# Patient Record
Sex: MALE | Race: WHITE | NOT HISPANIC OR LATINO | Employment: FULL TIME | URBAN - METROPOLITAN AREA
[De-identification: names, ages, dates, MRNs, and addresses within clinical notes are randomized per-mention and may not be internally consistent; named-entity substitution may affect disease eponyms.]

---

## 2018-02-05 ENCOUNTER — TRANSCRIBE ORDERS (OUTPATIENT)
Dept: ENDOCRINOLOGY | Facility: CLINIC | Age: 28
End: 2018-02-05

## 2018-02-05 DIAGNOSIS — R79.89 ELEVATED CORTISOL LEVEL: Primary | ICD-10-CM

## 2018-02-05 DIAGNOSIS — R61 HYPERHIDROSIS: ICD-10-CM

## 2018-02-05 DIAGNOSIS — L90.6 STRIAE: ICD-10-CM

## 2018-06-04 ENCOUNTER — OFFICE VISIT (OUTPATIENT)
Dept: ENDOCRINOLOGY | Facility: CLINIC | Age: 28
End: 2018-06-04

## 2018-06-04 VITALS
HEART RATE: 81 BPM | SYSTOLIC BLOOD PRESSURE: 120 MMHG | BODY MASS INDEX: 34.4 KG/M2 | DIASTOLIC BLOOD PRESSURE: 74 MMHG | OXYGEN SATURATION: 99 % | HEIGHT: 72 IN | WEIGHT: 254 LBS

## 2018-06-04 DIAGNOSIS — R61 EXCESSIVE SWEATING: ICD-10-CM

## 2018-06-04 DIAGNOSIS — R68.89 ABNORMAL ENDOCRINE LABORATORY TEST FINDING: Primary | ICD-10-CM

## 2018-06-04 LAB — HBA1C MFR BLD: 5 %

## 2018-06-04 PROCEDURE — 99243 OFF/OP CNSLTJ NEW/EST LOW 30: CPT | Performed by: INTERNAL MEDICINE

## 2018-06-04 PROCEDURE — 83036 HEMOGLOBIN GLYCOSYLATED A1C: CPT | Performed by: INTERNAL MEDICINE

## 2018-06-04 RX ORDER — BUSPIRONE HYDROCHLORIDE 15 MG/1
15 TABLET ORAL 2 TIMES DAILY
COMMUNITY
End: 2022-07-14

## 2018-06-04 RX ORDER — DEXTROAMPHETAMINE SACCHARATE, AMPHETAMINE ASPARTATE, DEXTROAMPHETAMINE SULFATE AND AMPHETAMINE SULFATE 5; 5; 5; 5 MG/1; MG/1; MG/1; MG/1
40 TABLET ORAL DAILY
COMMUNITY

## 2018-06-04 RX ORDER — DEXAMETHASONE 1 MG
TABLET ORAL
Qty: 1 TABLET | Refills: 0 | Status: SHIPPED | OUTPATIENT
Start: 2018-06-04 | End: 2022-07-14

## 2018-06-04 RX ORDER — FLUOXETINE HYDROCHLORIDE 20 MG/1
10 CAPSULE ORAL DAILY
COMMUNITY

## 2018-06-04 RX ORDER — DEXTROAMPHETAMINE SACCHARATE, AMPHETAMINE ASPARTATE, DEXTROAMPHETAMINE SULFATE AND AMPHETAMINE SULFATE 2.5; 2.5; 2.5; 2.5 MG/1; MG/1; MG/1; MG/1
10 TABLET ORAL AS NEEDED
COMMUNITY
End: 2022-07-14

## 2018-06-08 ENCOUNTER — TELEPHONE (OUTPATIENT)
Dept: INTERNAL MEDICINE | Facility: CLINIC | Age: 28
End: 2018-06-08

## 2018-06-08 NOTE — TELEPHONE ENCOUNTER
PATIENT WAS TOLD TO HAVE LABS DRAWN FOR OVERNIGHT DEXA AND AM CORTISOL. HE STATES HE DID NOT REALIZE HE NEEDED TO BE FASTING AND ATE 4 HOURS BEFORE THE BLOOD DRAW. HE IS CALLING TO SEE IF HE NEEDS TO HAVE THIS REDRAWN. YOU CAN REACH PATIENT [phone].

## 2018-06-08 NOTE — TELEPHONE ENCOUNTER
Spoke with  and she called patient . Informed patient that he does not have to repeat labs that it is ok that he was not fasting .

## 2018-06-09 DIAGNOSIS — R68.89 ABNORMAL ENDOCRINE LABORATORY TEST FINDING: ICD-10-CM

## 2018-06-09 LAB — CORTIS AM PEAK SERPL-MCNC: 0.6 UG/DL (ref 6.2–19.4)

## 2018-06-12 LAB
CORTIS F 24H UR-MRATE: 50 UG/24 HR (ref 0–50)
CORTIS F UR-MCNC: 16 UG/L
CREAT 24H UR-MRATE: 2437 MG/24 HR (ref 1000–2000)
CREAT UR-MCNC: 78.6 MG/DL

## 2018-06-13 ENCOUNTER — TELEPHONE (OUTPATIENT)
Dept: ENDOCRINOLOGY | Facility: CLINIC | Age: 28
End: 2018-06-13

## 2018-06-13 LAB
CORTIS SAL-MCNC: 0.01 UG/DL
CORTIS SAL-MCNC: 0.01 UG/DL

## 2018-06-19 PROBLEM — R61 EXCESSIVE SWEATING: Status: ACTIVE | Noted: 2018-06-19

## 2018-06-19 NOTE — PROGRESS NOTES
Chief Complaint   Patient presents with   • Elevated Cortisol     Consult for Dr. Loren Mas        HPI:   Andreas Barcenas is a 28 y.o.male sent in consultation by Loren Mas MD for further evaluation of an elevated 24 hr urine cortosol. 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  - he presented to his PCP in early 2018 with complaints of weight gain, difficulty concentrating/comprehending, difficulty sleeping and excessive sweating. On further review, these are not new symptoms and have been present for several years. Specifically, he has had difficulty sleeping since high school. He reports he is able to go to sleep by 10-11 pm, but wakes up between 3-5 AM and has difficulty falling back asleep. This occurs on trazadone and melatonin. He reports his ADHD is fairly well controlled on Adderall and amphetamine salts PRN. His depression and anxiety have been fairly well controlled with fluoxetine and buspirone.   - He has had excessive sweating since for > 5 years that started before any of his current medications. The sweating occurs occasionally while he sleeps.     - He was concerned that he might have Cushing's and the following evaluation was completed by his PCP:  (01/2018) 24 hr urine cortisol 51.6 (normal < 50), 24 hr urine creatinine 2.84 (high), total volume 2600 mL - pt does not recall having uncontrolled depression at that time. TSH was normal at 1.45  (02/27/18) T laboratories: [place] salivary cortisol 1.5 (0.4 - 1.0)    In further review, he has not had an increase in BP, does not had excessive bruising, does not have hyperglycemia, denies increased acne.    Review of Systems   Constitutional: Positive for fatigue.        Weight gain   HENT: Negative.    Eyes: Negative.    Respiratory: Negative.    Cardiovascular: Negative.    Gastrointestinal: Negative.    Endocrine: Positive for  "heat intolerance.   Genitourinary: Negative.    Musculoskeletal: Positive for arthralgias.   Skin: Positive for wound.        Excessive sweating   Allergic/Immunologic: Negative.    Neurological: Negative.    Hematological: Negative.    Psychiatric/Behavioral: Positive for sleep disturbance.        Anxiety, depression     Past Medical History:   Diagnosis Date   • ADHD    • Depression    • Obesity      family history includes Cancer in his paternal grandfather; Diabetes in his father and paternal grandfather; Hypertension in his father; Mental illness in his brother and mother; Obesity in his brother, father, and maternal grandfather; Stroke in his father; Thyroid disease in his father.  No past surgical history on file.  Social History   Substance Use Topics   • Smoking status: Never Smoker   • Smokeless tobacco: Never Used   • Alcohol use 0.6 oz/week     1 Standard drinks or equivalent per week      Comment: 1 to 2 per day      Current Outpatient Prescriptions:   •  amphetamine-dextroamphetamine (ADDERALL) 10 MG tablet, Take 10 mg by mouth As Needed., Disp: , Rfl:   •  amphetamine-dextroamphetamine (ADDERALL) 20 MG tablet, Take 40 mg by mouth Daily., Disp: , Rfl:   •  busPIRone (BUSPAR) 15 MG tablet, Take 15 mg by mouth 2 (Two) Times a Day., Disp: , Rfl:   •  FLUoxetine (PROzac) 20 MG capsule, Take 20 mg by mouth Daily., Disp: , Rfl:     No Known Allergies    /74   Pulse 81   Ht 182.9 cm (72\")   Wt 115 kg (254 lb)   SpO2 99%   BMI 34.45 kg/m²   Physical Exam   Constitutional: He is oriented to person, place, and time. He appears well-developed. No distress.   overweight   HENT:   Head: Normocephalic.   Mouth/Throat: Oropharynx is clear and moist.   Eyes: Conjunctivae and EOM are normal. Pupils are equal, round, and reactive to light.   Neck: Neck supple. No tracheal deviation present. No thyromegaly present.   No palpable thyroid nodules     Cardiovascular: Normal rate, regular rhythm and normal heart " sounds.    No murmur heard.  Pulmonary/Chest: Effort normal and breath sounds normal. No respiratory distress.   Abdominal: Soft. Bowel sounds are normal. He exhibits no mass. There is no tenderness.   Thin white striae   Musculoskeletal: He exhibits no edema.   Lymphadenopathy:     He has no cervical adenopathy.   Neurological: He is alert and oriented to person, place, and time. No cranial nerve deficit.   Skin: Skin is warm and dry. He is not diaphoretic. No erythema.   Psychiatric: He has a normal mood and affect. His behavior is normal.   Vitals reviewed.    LABS/IMAGING: outside records reviewed and summarized in HPI  Results for orders placed or performed in visit on 06/04/18   POC Glycosylated Hemoglobin (Hb A1C)   Result Value Ref Range    Hemoglobin A1C 5.0 %     See addendum for outside labs completed after his visit  ASSESSMENT/PLAN:  1) abnormal thyroid function test: resolved  - reviewed with patient that his increased 24 hr urine cortisol is actually not elevated when adjusted for his high 24 hour creatinine.  - In addition, discussed that not all lab assays for salivary hormones are reliable. Specifically, [facility] laboratories is not recommended by most Endocrine societies. Reviewed some of the multiple causes for false positive results.  - overall, discussed with patient I have a low clinical suspicion for hypercortisolemia based on his history and physical exam; however, completing proper screening tests is reasonable.  Will have the patient complete a 24 hr urine cortisol with creatinine, 2 midnight salivary cortisol samples at [facility], and a 1 mg overnight dexamethasone suppression test.    ADDENDUM: lab results -   (06/07/2018) 24 hour urine creatinine 2,437 (1000 - 2000 mg/24 hrs), 24 hour urine cortisol 50 mcg/24 hrs - NORMAL  (06/07/2018) midnight salivary cortisol - 0.013 (<0.010 - 0.90) - NORMAL  (06/08/2018) midnight salivary cortisol - 0.014 (<0.010 - 0.90) - NORMAL  (06/09/2018) 8AM  cortisol after 1 mg dexamethasone overnight suppression: 0.6 (normal suppression < 1.8) - NORMAL    Lab results were discussed with patient and copy of results were mailed to him    2) excessive sweating:  - no clear endocrine cause for his excessive sweating  - discussed that oxybutynin 2.5 to 5 mg BID can be used off label for excessive sweating if anticholinergic side effects can be tolerated. Pt to discuss with his PCP.    RTC as needed

## 2022-07-14 ENCOUNTER — OFFICE VISIT (OUTPATIENT)
Dept: FAMILY MEDICINE CLINIC | Facility: CLINIC | Age: 32
End: 2022-07-14

## 2022-07-14 ENCOUNTER — LAB (OUTPATIENT)
Dept: LAB | Facility: HOSPITAL | Age: 32
End: 2022-07-14

## 2022-07-14 VITALS
OXYGEN SATURATION: 99 % | SYSTOLIC BLOOD PRESSURE: 126 MMHG | DIASTOLIC BLOOD PRESSURE: 86 MMHG | BODY MASS INDEX: 42.66 KG/M2 | HEIGHT: 72 IN | WEIGHT: 315 LBS | HEART RATE: 86 BPM

## 2022-07-14 DIAGNOSIS — Z00.00 ENCOUNTER FOR MEDICAL EXAMINATION TO ESTABLISH CARE: Primary | ICD-10-CM

## 2022-07-14 DIAGNOSIS — R05.9 COUGH: ICD-10-CM

## 2022-07-14 DIAGNOSIS — Z00.00 ENCOUNTER FOR MEDICAL EXAMINATION TO ESTABLISH CARE: ICD-10-CM

## 2022-07-14 DIAGNOSIS — E66.01 CLASS 3 SEVERE OBESITY DUE TO EXCESS CALORIES WITHOUT SERIOUS COMORBIDITY WITH BODY MASS INDEX (BMI) OF 40.0 TO 44.9 IN ADULT: ICD-10-CM

## 2022-07-14 DIAGNOSIS — Z23 IMMUNIZATION DUE: ICD-10-CM

## 2022-07-14 DIAGNOSIS — F90.9 ATTENTION DEFICIT HYPERACTIVITY DISORDER (ADHD), UNSPECIFIED ADHD TYPE: ICD-10-CM

## 2022-07-14 DIAGNOSIS — F41.9 ANXIETY: ICD-10-CM

## 2022-07-14 LAB
ALBUMIN SERPL-MCNC: 4.8 G/DL (ref 3.5–5.2)
ALBUMIN/GLOB SERPL: 1.8 G/DL
ALP SERPL-CCNC: 85 U/L (ref 39–117)
ALT SERPL W P-5'-P-CCNC: 72 U/L (ref 1–41)
ANION GAP SERPL CALCULATED.3IONS-SCNC: 13.9 MMOL/L (ref 5–15)
AST SERPL-CCNC: 34 U/L (ref 1–40)
BILIRUB SERPL-MCNC: 0.4 MG/DL (ref 0–1.2)
BUN SERPL-MCNC: 14 MG/DL (ref 6–20)
BUN/CREAT SERPL: 13.7 (ref 7–25)
CALCIUM SPEC-SCNC: 9 MG/DL (ref 8.6–10.5)
CHLORIDE SERPL-SCNC: 102 MMOL/L (ref 98–107)
CHOLEST SERPL-MCNC: 191 MG/DL (ref 0–200)
CO2 SERPL-SCNC: 26.1 MMOL/L (ref 22–29)
CREAT SERPL-MCNC: 1.02 MG/DL (ref 0.76–1.27)
DEPRECATED RDW RBC AUTO: 40.5 FL (ref 37–54)
EGFRCR SERPLBLD CKD-EPI 2021: 101.4 ML/MIN/1.73
ERYTHROCYTE [DISTWIDTH] IN BLOOD BY AUTOMATED COUNT: 12.6 % (ref 12.3–15.4)
GLOBULIN UR ELPH-MCNC: 2.6 GM/DL
GLUCOSE SERPL-MCNC: 77 MG/DL (ref 65–99)
HBA1C MFR BLD: 5.4 % (ref 4.8–5.6)
HCT VFR BLD AUTO: 47.6 % (ref 37.5–51)
HDLC SERPL-MCNC: 39 MG/DL (ref 40–60)
HGB BLD-MCNC: 16.7 G/DL (ref 13–17.7)
LDLC SERPL CALC-MCNC: 118 MG/DL (ref 0–100)
LDLC/HDLC SERPL: 2.92 {RATIO}
MCH RBC QN AUTO: 31.6 PG (ref 26.6–33)
MCHC RBC AUTO-ENTMCNC: 35.1 G/DL (ref 31.5–35.7)
MCV RBC AUTO: 90.2 FL (ref 79–97)
PLATELET # BLD AUTO: 287 10*3/MM3 (ref 140–450)
PMV BLD AUTO: 9.1 FL (ref 6–12)
POTASSIUM SERPL-SCNC: 4 MMOL/L (ref 3.5–5.2)
PROT SERPL-MCNC: 7.4 G/DL (ref 6–8.5)
RBC # BLD AUTO: 5.28 10*6/MM3 (ref 4.14–5.8)
SODIUM SERPL-SCNC: 142 MMOL/L (ref 136–145)
TRIGL SERPL-MCNC: 191 MG/DL (ref 0–150)
TSH SERPL DL<=0.05 MIU/L-ACNC: 1.68 UIU/ML (ref 0.27–4.2)
VLDLC SERPL-MCNC: 34 MG/DL (ref 5–40)
WBC NRBC COR # BLD: 8.36 10*3/MM3 (ref 3.4–10.8)

## 2022-07-14 PROCEDURE — 0053A COVID-19 (PFIZER) 12+ YRS: CPT | Performed by: STUDENT IN AN ORGANIZED HEALTH CARE EDUCATION/TRAINING PROGRAM

## 2022-07-14 PROCEDURE — 99204 OFFICE O/P NEW MOD 45 MIN: CPT | Performed by: STUDENT IN AN ORGANIZED HEALTH CARE EDUCATION/TRAINING PROGRAM

## 2022-07-14 PROCEDURE — 83036 HEMOGLOBIN GLYCOSYLATED A1C: CPT

## 2022-07-14 PROCEDURE — 80050 GENERAL HEALTH PANEL: CPT

## 2022-07-14 PROCEDURE — 91305 COVID-19 (PFIZER) 12+ YRS: CPT | Performed by: STUDENT IN AN ORGANIZED HEALTH CARE EDUCATION/TRAINING PROGRAM

## 2022-07-14 PROCEDURE — 80061 LIPID PANEL: CPT

## 2022-07-14 RX ORDER — BUPROPION HYDROCHLORIDE 150 MG/1
150 TABLET ORAL DAILY
COMMUNITY

## 2022-07-14 RX ORDER — BENZONATATE 100 MG/1
100 CAPSULE ORAL 3 TIMES DAILY PRN
Qty: 30 CAPSULE | Refills: 0 | Status: SHIPPED | OUTPATIENT
Start: 2022-07-14

## 2022-07-14 NOTE — PROGRESS NOTES
New Patient Office Visit      Patient Name: Andreas Barcenas  : 1990   MRN: [id number]   Care Team: Patient Care Team:  Page Dunn DO as PCP - General (Internal Medicine)    Chief Complaint:    Chief Complaint   Patient presents with   • general adult checkup   • Obesity       History of Present Illness: Andreas Barcenas is a 32 y.o. male with obesity, ADHD, anxiety/depression (in remission) who is here today to establish care. 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     ADHD - following with [facility]. Well controlled with Adderall 40mg daily.     Anxiety/Depression - following with [facility]. Well controlled with Wellbutrin and Prozac.     Obesity - ongoing for several years, reports poor diet. Lives busy, but sedentary, lifestyle and often resorts to fast/convenient food. Denies exercise.     Had covid infection one month ago - would like covid booster today. Reports recovering well but still has lingering cough. Requests tessalon perles.     Family History  Father (HLD, HTN, T2DM)  Paternal Grandfather (T2DM, unspecified cancer)    Subjective      Review of Systems:   Review of Systems - See HPI    Past Medical History:   Past Medical History:   Diagnosis Date   • ADHD    • Depression    • Obesity        Past Surgical History: History reviewed. No pertinent surgical history.    Family History:   Family History   Problem Relation Age of Onset   • Mental illness Mother    • Thyroid disease Father    • Stroke Father    • Diabetes Father    • Obesity Father    • Hypertension Father    • Mental illness Brother    • Obesity Brother    • Obesity Maternal Grandfather    • Cancer Paternal Grandfather    • Diabetes Paternal Grandfather        Social History:   Social History     Socioeconomic History   • Marital status: Single   Tobacco Use   • Smoking status: Never Smoker   • Smokeless tobacco: Never Used   Substance and Sexual Activity   • Alcohol use: Yes     Alcohol/week: 1.0 standard drink      "Types: 1 Standard drinks or equivalent per week     Comment: 1 to 2 per day    • Drug use: No   • Sexual activity: Defer       Tobacco History:   Social History     Tobacco Use   Smoking Status Never Smoker   Smokeless Tobacco Never Used       Medications:     Current Outpatient Medications:   •  amphetamine-dextroamphetamine (ADDERALL) 20 MG tablet, Take 40 mg by mouth Daily., Disp: , Rfl:   •  buPROPion XL (WELLBUTRIN XL) 150 MG 24 hr tablet, Take 150 mg by mouth Daily., Disp: , Rfl:   •  FLUoxetine (PROzac) 20 MG capsule, Take 20 mg by mouth Daily., Disp: , Rfl:   •  benzonatate (Tessalon Perles) 100 MG capsule, Take 1 capsule by mouth 3 (Three) Times a Day As Needed for Cough., Disp: 30 capsule, Rfl: 0    Allergies:   No Known Allergies    Objective     Physical Exam:  Vital Signs:   Vitals:    07/14/22 1442   BP: 126/86   Pulse: 86   SpO2: 99%   Weight: (!) 143 kg (316 lb)   Height: 182.9 cm (72\")     Body mass index is 42.86 kg/m².     Physical Exam  Vitals reviewed.   Constitutional:       Appearance: Normal appearance. He is obese.   Cardiovascular:      Rate and Rhythm: Normal rate and regular rhythm.      Heart sounds: Normal heart sounds.   Pulmonary:      Effort: Pulmonary effort is normal. No respiratory distress.      Breath sounds: Normal breath sounds. No wheezing.   Skin:     General: Skin is warm and dry.   Neurological:      Mental Status: He is alert.   Psychiatric:         Mood and Affect: Mood normal.         Behavior: Behavior normal.         Judgment: Judgment normal.         Assessment / Plan      Assessment/Plan:   Problems Addressed This Visit  Diagnoses and all orders for this visit:    1. Encounter for medical examination to establish care (Primary)  -     CBC (No Diff); Future  -     Lipid Panel; Future  -     Hemoglobin A1c; Future  -     Comprehensive Metabolic Panel; Future  -     TSH; Future  Discussed importance of preventative care including vaccinations, age appropriate cancer " screening, routine lab work, healthy diet, and active lifestyle.  Covid booster today and routine labs    2. Class 3 severe obesity due to excess calories without serious comorbidity with body mass index (BMI) of 40.0 to 44.9 in adult (HCC)  -     CBC (No Diff); Future  -     Lipid Panel; Future  -     Hemoglobin A1c; Future  -     Comprehensive Metabolic Panel; Future  -     TSH; Future  Counseled patient on importance of weight loss and maintaining healthy lifestyle. Recommended calorie deficit, calorie tracking with myfitness pal and making long term, healthier dietary choices. Focus on meal prepping to avoid convenient, fast food options. Goal 1-2lb weight loss per week.    3. Anxiety  4. Attention deficit hyperactivity disorder (ADHD), unspecified ADHD type  Well controlled with adderall, wellbutrin, and prozac - managed per [facility] behavioral [facility]    5. Cough  -     benzonatate (Tessalon Perles) 100 MG capsule; Take 1 capsule by mouth 3 (Three) Times a Day As Needed for Cough.  Dispense: 30 capsule; Refill: 0  Likely due to recent covid infection 4 weeks ago. Rx for tessalon preles prn and will work up further if symptoms persist/worsen.    6. Immunization due  -     COVID-19 Vaccine (Pfizer) Gray Cap      Discussed importance of preventative care including vaccinations, age appropriate cancer screening, routine lab work, healthy diet, and active lifestyle.      Plan of care reviewed with patient at the conclusion of today's visit. Education was provided regarding diagnosis and management.  Patient verbalizes understanding of and agreement with management plan.      Follow Up:   Return in about 3 months (around 10/14/2022) for Recheck weight loss .          DO MALLORY Nunez RD  [facility]  [address]  [facility] [phone]  Fax [phone]  Phone [phone]

## 2022-11-03 ENCOUNTER — OFFICE VISIT (OUTPATIENT)
Dept: FAMILY MEDICINE CLINIC | Facility: CLINIC | Age: 32
End: 2022-11-03

## 2022-11-03 VITALS
BODY MASS INDEX: 42.66 KG/M2 | WEIGHT: 315 LBS | DIASTOLIC BLOOD PRESSURE: 100 MMHG | HEIGHT: 72 IN | HEART RATE: 95 BPM | OXYGEN SATURATION: 98 % | SYSTOLIC BLOOD PRESSURE: 138 MMHG

## 2022-11-03 DIAGNOSIS — R05.3 CHRONIC COUGH: Primary | ICD-10-CM

## 2022-11-03 DIAGNOSIS — E66.01 CLASS 3 SEVERE OBESITY DUE TO EXCESS CALORIES WITHOUT SERIOUS COMORBIDITY WITH BODY MASS INDEX (BMI) OF 40.0 TO 44.9 IN ADULT: ICD-10-CM

## 2022-11-03 PROCEDURE — 99214 OFFICE O/P EST MOD 30 MIN: CPT | Performed by: STUDENT IN AN ORGANIZED HEALTH CARE EDUCATION/TRAINING PROGRAM

## 2022-11-03 RX ORDER — OMEPRAZOLE 40 MG/1
40 CAPSULE, DELAYED RELEASE ORAL DAILY
Qty: 30 CAPSULE | Refills: 5 | Status: SHIPPED | OUTPATIENT
Start: 2022-11-03 | End: 2023-01-30 | Stop reason: SDUPTHER

## 2022-11-03 NOTE — PROGRESS NOTES
Established Office Visit      Patient Name: Andreas Barcenas  : 1990   MRN: [id number]   Care Team: Patient Care Team:  Page Dunn DO as PCP - General (Internal Medicine)    Chief Complaint:    Chief Complaint   Patient presents with   • Obesity     FOLLOW UP    • Cough     AFTER EATING        History of Present Illness: Andreas Barcenas is a 32 y.o. male with ADHD, depression, obesity who is here today to follow up with weight. He is following with [facility] for behavioral health management.     Has gained 6 lb since last visit. He reports poor dietary habits, uses food as coping mechanism and has poor relationship with food. Following with therapist but interested in nutritionist. Does not want to try medications or invasive procedures at this time.     Reports persistent nonproductive cough ongoing >3 months, seems to occur almost exclusively after eating. Denies dyspepsia, belching or bloating. No fevers.             Subjective      Review of Systems:   Review of Systems - See HPI    I have reviewed and the following portions of the patient's history were updated as appropriate: past family history, past medical history, past social history, past surgical history and problem list.    Medications:     Current Outpatient Medications:   •  amphetamine-dextroamphetamine (ADDERALL) 20 MG tablet, Take 40 mg by mouth Daily., Disp: , Rfl:   •  buPROPion XL (WELLBUTRIN XL) 150 MG 24 hr tablet, Take 150 mg by mouth Daily., Disp: , Rfl:   •  FLUoxetine (PROzac) 20 MG capsule, Take 10 mg by mouth Daily., Disp: , Rfl:   •  benzonatate (Tessalon Perles) 100 MG capsule, Take 1 capsule by mouth 3 (Three) Times a Day As Needed for Cough., Disp: 30 capsule, Rfl: 0  •  omeprazole (priLOSEC) 40 MG capsule, Take 1 capsule by mouth Daily., Disp: 30 capsule, Rfl: 5    Allergies:   No Known Allergies    Objective     Physical Exam:  Vital Signs:   Vitals:    22 1526   BP: 138/100   Pulse: 95   SpO2: 98%   Weight:  "(!) 146 kg (322 lb 9.6 oz)   Height: 182.9 cm (72.01\")     Body mass index is 43.74 kg/m².     Physical Exam  Vitals reviewed.   Constitutional:       Appearance: Normal appearance. He is obese. He is not ill-appearing.   Cardiovascular:      Rate and Rhythm: Normal rate.   Pulmonary:      Effort: Pulmonary effort is normal. No respiratory distress.   Neurological:      Mental Status: He is alert.   Psychiatric:         Mood and Affect: Mood normal.         Behavior: Behavior normal.         Judgment: Judgment normal.         Assessment / Plan      Assessment/Plan:   Problems Addressed This Visit  Diagnoses and all orders for this visit:    1. Chronic cough (Primary)  -     omeprazole (priLOSEC) 40 MG capsule; Take 1 capsule by mouth Daily.  Dispense: 30 capsule; Refill: 5  Given temporal relation to meals, suspect GERD. Will start trial of PPI. Discussed alternative noninfectious common etiologies for chronic cough include upper airway cough syndrome in setting of allergies and cough variant asthma. Will let me know if symptoms persist despite PPI trial. At that time, we will obtain CXR and start antihistamine trial.     2. Class 3 severe obesity due to excess calories without serious comorbidity with body mass index (BMI) of 40.0 to 44.9 in adult (HCC)  -     Ambulatory Referral to Nutrition Services  Counseled patient on importance of weight loss and maintaining healthy lifestyle. Recommended portion control and 150 minutes of exercise per week. Referred to nutrition. Discussed weight loss medications to assist such as GLP-1 agonist but he is not interested in these due to side effect profile. Not a candidate for phentermine with concurrent adderall use. Not interested in bariatric surgery intervention - would like to focus on making significant lifestyle modifications and I agree with this.           Plan of care reviewed with patient at the conclusion of today's visit. Education was provided regarding diagnosis " and management.  Patient verbalizes understanding of and agreement with management plan.    Follow Up:   Return in about 3 months (around 2/3/2023) for Recheck cough and wieght .        DO MALLORY Nunez RD  [facility]  [address]  [facility] [phone]  Fax [phone]  Phone [phone]

## 2022-12-29 ENCOUNTER — HOSPITAL ENCOUNTER (OUTPATIENT)
Dept: NUTRITION | Facility: HOSPITAL | Age: 32
Setting detail: RECURRING SERIES
Discharge: HOME | End: 2022-12-29

## 2022-12-29 PROCEDURE — 97802 MEDICAL NUTRITION INDIV IN: CPT

## 2022-12-29 NOTE — CONSULTS
"Adult Outpatient Nutrition  Assessment/PES    Patient Name:  Andreas Barcenas  YOB: 1990  MRN: [id number]    Assessment Date:  12/29/2022    This medical referred consult was provided via ZOOM as patient was unable to attend an in-office appointment today due to the COVID-19 crisis. Consent for treatment was given verbally. RD spent a total of 60 minutes with patient today.      Reason for visit:     Patient is a 31 y/o M who is referred today for wt loss. Pt is also being treated for GERD, anxiety/depression, and ADHD. Pt says his biggest challenges with his diet is portion control and snacking.     Pt has tried many diets/prgrams in the past to try and lose weight, low carb and keto included.     Pt asked great questions regarding macronutrients and the breakdown of each, recipes, etc.      Session Details:     Attendees: Patient     Anthropometrics:     Ht Readings from Last 1 Encounters:   11/03/22 182.9 cm (72.01\")      Wt Readings from Last 1 Encounters:   11/03/22 (!) 146 kg (322 lb 9.6 oz)      BMI Readings from Last 1 Encounters:   11/03/22 43.74 kg/m²      Patient weight not recorded           Pertinent Medications/Supplements:    Tumeric  Fish oil    Nutrition Assessment:     Food assistance programs: None  Who prepares most meals: unknown  Who does grocery shopping: unknown   Frequency of eating out: unknown    Diet recall: please refer to questionnaire submission under Misc reports for more information    Physical activity:    Activity or exercise program: None consistently besides walking around the hospital    Assessed Needs:     Estimated energy needs: 2,200 (Jeannette St. Jeor, activity factor 1.2, subtracting 500 calories to promote safe weight loss)  Estimated protein needs: 110 grams a day (20 grams per meal to start with, if adding snacks, encouraging to add some protein)      PES Statement:     Excessive oral intake related to portion control, snacking and not eating balanced meals " as evidenced by dietary recall and interview.      Discussion:     Consistency of meals: We discussed the importance of eating consistent, balanced meals to meet nutrient needs to promote satiety and satisfaction when eating. RD recommended patient to try and incorporate a small meal or snack in the morning and to avoid overeating and snacking throughout the day. RD explained meal patterns should be individualized from person to person. We discussed how sometimes cravings are trigger due to skipping meals and not incorporating all 3 categories of nutrients.      The components of a healthy meal and snack: We discussed how the three main nutrients our bodies need are protein, carbohydrates, and fat. RD recommended the patient aim to have a source of lean protein, fiber rich carbohydrates, and heart healthy fats with each of his meals and snacks. RD provided examples, such as having some yogurt with his sandwich for additional carbohydrates and protein rather than just having a sandwich by itself. We discussed the benefits this provides, such as meeting nutrient needs, and promoting satiety and satisfaction when eating.     Goals:    1. 15 minutes of activity, 3 days a week.  2. Focusing on balancing lunch, 3 days a week.    Resources Provided:     RD provided resources after session:  Eating to feel your best  The 3 macronutrients  Macronutrient foundations  Quick meal ideas  Quick breakfast ideas        Total of 60 minutes spent with patient on nutrition counseling. Education based on Academy of Nutrition and Dietetics guidelines. Patient was provided with RD's contact information. Follow up visit is scheduled for 2/9 at 3:45 PM. Thank you for this referral.        Electronically signed by:  Robyn Molina RD  12/29/22 15:08 EST

## 2023-01-30 ENCOUNTER — TELEMEDICINE (OUTPATIENT)
Dept: FAMILY MEDICINE CLINIC | Facility: CLINIC | Age: 33
End: 2023-01-30
Payer: COMMERCIAL

## 2023-01-30 DIAGNOSIS — R05.3 CHRONIC COUGH: ICD-10-CM

## 2023-01-30 DIAGNOSIS — E66.01 CLASS 3 SEVERE OBESITY DUE TO EXCESS CALORIES WITHOUT SERIOUS COMORBIDITY WITH BODY MASS INDEX (BMI) OF 40.0 TO 44.9 IN ADULT: Primary | ICD-10-CM

## 2023-01-30 PROCEDURE — 99213 OFFICE O/P EST LOW 20 MIN: CPT | Performed by: STUDENT IN AN ORGANIZED HEALTH CARE EDUCATION/TRAINING PROGRAM

## 2023-01-30 RX ORDER — OMEPRAZOLE 40 MG/1
40 CAPSULE, DELAYED RELEASE ORAL DAILY
Qty: 30 CAPSULE | Refills: 5 | Status: SHIPPED | OUTPATIENT
Start: 2023-01-30

## 2023-01-30 NOTE — PROGRESS NOTES
"Chief Complaint  Obesity     Subjective         Andreas Barcenas presents to [facility]  History of Present Illness Andreas Barcenas is a 32 y.o. male with ADHD, depression, obesity who is here today via telehealth to follow up with weight. Since last visit he has established with nutritionist. He found this minimally helpful. He has been cutting back on calories without successful weight loss. He is frustrated with this and interested in medications to help with weight loss. He has struggled for years with obesity.       Objective   Vital Signs:   There were no vitals taken for this visit.    Estimated body mass index is 43.74 kg/m² as calculated from the following:    Height as of 11/3/22: 182.9 cm (72.01\").    Weight as of 11/3/22: 146 kg (322 lb 9.6 oz).     Physical Exam   Constitutional:   obese   Pulmonary/Chest: Effort normal.   Psychiatric: He has a normal mood and affect.     Result Review :                 Assessment and Plan    Diagnoses and all orders for this visit:    1. Class 3 severe obesity due to excess calories without serious comorbidity with body mass index (BMI) of 40.0 to 44.9 in adult (HCC) (Primary)  -     Semaglutide-Weight Management 0.25 MG/0.5ML solution auto-injector; Inject 0.25 mg under the skin into the appropriate area as directed 1 (One) Time Per Week for 28 days.  Dispense: 2 mL; Refill: 0    2. Chronic cough  -     omeprazole (priLOSEC) 40 MG capsule; Take 1 capsule by mouth Daily.  Dispense: 30 capsule; Refill: 5         Counseled patient on importance of weight loss and maintaining healthy lifestyle. Recommended portion control, calorie deficit, and 150 minutes of exercise per week. Referred to nutrition last visit and he has established. Discussed weight loss medications to assist such as GLP-1 agonist and he is interested in this now. Not a candidate for phentermine with concurrent adderall use.   Patient has tried to lose weight for several months " without significant success with lifestyle modifications alone. Their elevated BMI of 43.7 puts them at increased risk for developing cardiovascular disease, ADRIA, T2DM, metabolic syndrome, among others. Patient would benefit  from medication to assist with weight loss. We will try Wegovy. Discussed potential side effects and patient will let me know if they experiences these. We discussed importance of uptitrating dose weekly to avoid GI side effects and they expressed understanding.         Follow Up   Follow up in 8 weeks or sooner if needed    Patient was given instructions and counseling regarding his condition or for health maintenance advice. Please see specific information pulled into the AVS if appropriate.     Mode of Visit: Video  Location of patient: home  Location of provider: [facility] clinic  You have chosen to receive care through a telehealth visit.  The patient has signed the video visit consent form.  The visit included audio and video interaction. No technical issues occurred during this visit.

## 2023-02-01 ENCOUNTER — PATIENT MESSAGE (OUTPATIENT)
Dept: FAMILY MEDICINE CLINIC | Facility: CLINIC | Age: 33
End: 2023-02-01
Payer: COMMERCIAL

## 2023-02-01 DIAGNOSIS — E66.01 CLASS 3 SEVERE OBESITY DUE TO EXCESS CALORIES WITHOUT SERIOUS COMORBIDITY WITH BODY MASS INDEX (BMI) OF 40.0 TO 44.9 IN ADULT: Primary | ICD-10-CM

## 2023-02-09 ENCOUNTER — HOSPITAL ENCOUNTER (OUTPATIENT)
Dept: NUTRITION | Facility: HOSPITAL | Age: 33
Setting detail: RECURRING SERIES
Discharge: HOME | End: 2023-02-09

## 2023-02-10 NOTE — PROGRESS NOTES
Adult Outpatient Nutrition    Patient Name:  Andreas Barcenas  YOB: 1990  MRN: [id number]    Assessment Date:  2/10/2023    This medical referred consult was provided via PHONE. Consent for treatment was given verbally. RD spent a total of 30 minutes with patient today.      Pt hasn't seen any weight changes but has enjoyed the changes he's made so far. Pt has been adding a vegetable to most meals, has tried Hello[employer] and has cut back on eating out.     Pt says breakfast is his hardest meal. RD suggested oatmeal with a source of protein (in or on the side), breakfast burrito's, or smoothies with protein powder. Pt will sometimes eat lunch in the hospital cafeteria but has been choosing balanced options.     Pt has had a hard time incorporating activity but he still wants to attempt to increase activity. Pt also asked about alcohol and it's effect on wt loss/metabolism. RD explained that alcohol can be very caloric and affect metabolism but an occasional drink or 2 is fine. Pt is interested in cutting down on his alcohol intake.      Pertinent Medications/Supplements:     Tumeric  Fish oil     Physical activity:     Activity or exercise program: None consistently besides walking around the hospital     Assessed Needs:      Estimated energy needs: 2,200 ([place] [address] Jeor, activity factor 1.2, subtracting 500 calories to promote safe weight loss)  Estimated protein needs: 110 grams a day (20 grams per meal to start with, if adding snacks, encouraging to add some protein)         Goals:     1. 15 minutes of activity, 3 days a week.  2. Focusing on balancing breakfast, 3 days a week.  3. Waiting 20 minutes after he eats dinner before getting another serving of food.          Total of 30 minutes spent with patient on nutrition counseling. Education based on Academy of Nutrition and Dietetics guidelines. Patient was provided with RD's contact information. Follow up visit is scheduled for 2/9 at 3:45 PM.  Thank you for this referral.      Electronically signed by:  Robyn Molina RD  02/10/23 08:40 EST

## 2023-03-21 ENCOUNTER — HOSPITAL ENCOUNTER (OUTPATIENT)
Dept: NUTRITION | Facility: HOSPITAL | Age: 33
Setting detail: RECURRING SERIES
Discharge: HOME | End: 2023-03-21

## 2023-03-21 PROCEDURE — 97803 MED NUTRITION INDIV SUBSEQ: CPT

## 2023-03-24 NOTE — PROGRESS NOTES
Adult Outpatient Nutrition    Patient Name:  Andreas Barcenas  YOB: 1990  MRN: [id number]    Assessment Date:  3/24/2023    This medical referred consult was provided via ZOOM. Consent for treatment was given verbally. RD spent a total of 30 minutes with patient today.      Pt still hasn't seen any weight changes but has enjoyed the changes he's made so far. Pt mentioned he hasn't gained any more weight either which he's happy about. Before meeting with RD he was continuing to add weight.      Pt bought a  and has been adding fruit and vegetables in it along with protein powder and he will make a smoothie 3-4 times a week.      Pt has been working on home projects with his girlfriend and walking the dogs for about 15 minutes a few times a week.      Pt mentioned he's still having large dinners and going back for seconds. RD mentioned incorporating balanced snacks in between B and L and L and D like an RX bar, trail mix and cheese and crackers. Pt suggested measuring out these snacks so he doesn't boredom eat and RD assured him this was a good time, especially to incorporate a healthy snack instead of ignoring hunger ques or indulging in a sweet or less healthy snack option.      Pertinent Medications/Supplements:     Tumeric  Fish oil     Physical activity:     Activity or exercise program: None consistently besides walking around the hospital     Assessed Needs:      Estimated energy needs: 2,200 ([place] [address] Jeor, activity factor 1.2, subtracting 500 calories to promote safe weight loss)  Estimated protein needs: 110 grams a day (20 grams per meal to start with, if adding snacks, encouraging to add some protein)          Goals:     1. Balanced snack  2. Waiting 20 minutes after dinner before going back for a second helping.          Total of 30 minutes spent with patient on nutrition counseling. Education based on Academy of Nutrition and Dietetics guidelines. Patient was provided with RD's  contact information. Follow up visit is scheduled for 5/9 at 3:45 PM. Thank you for this referral.    Electronically signed by:  Robyn Molina RD  03/24/23 09:26 EDT

## 2023-05-09 ENCOUNTER — HOSPITAL ENCOUNTER (OUTPATIENT)
Dept: NUTRITION | Facility: HOSPITAL | Age: 33
Setting detail: RECURRING SERIES
Discharge: HOME | End: 2023-05-09

## 2023-05-10 NOTE — PROGRESS NOTES
Adult Outpatient Nutrition    Patient Name:  Andreas Barcenas  YOB: 1990  MRN: [id number]    Assessment Date:  5/10/2023    This medical referred consult was provided via ZOOM. Consent for treatment was given verbally. RD spent a total of 30 minutes with patient today.      Pt still hasn't seen any weight changes. RD suggested counting calories a few days a week to get a good average of where his hitting with his calories. Pt believes it's the snacks at work and at home that continue to be challenges for him. Pt is still enjoying smoothies for breakfast most mornings.     RD discussed water intake and electrolyte powder. Pt liked the idea of trying electrolyte powder to increase water intake and curbing some cravings. RD and pt also discussed low calorie snacks when pt is at home and feels like theres boredom eating. RD suggested popcorn.     RD also discussed meal planning which pt would also like to try with his girlfriend.         Pertinent Medications/Supplements:     Tumeric  Fish oil     Physical activity:     Activity or exercise program: Pt has recently started walking from his car to the hospital which is about a 10 minute walk both ways.      Assessed Needs:      Estimated energy needs: 2,200 ([place] [address]. Jeor, activity factor 1.2, subtracting 500 calories to promote safe weight loss)  Estimated protein needs: 110 grams a day (20 grams per meal to start with, if adding snacks, encouraging to add some protein)          Goals:     1. calorie counting, 3 days a week  2. Meal planning          Total of 30 minutes spent with patient on nutrition counseling. Education based on Academy of Nutrition and Dietetics guidelines. Patient was provided with RD's contact information.     Follow up visit is scheduled for 6/26 at 3:45 PM. Thank you for this referral.    Electronically signed by:  Robyn Molina RD  05/10/23 08:34 EDT